# Patient Record
Sex: MALE | Race: BLACK OR AFRICAN AMERICAN | Employment: FULL TIME | URBAN - NONMETROPOLITAN AREA
[De-identification: names, ages, dates, MRNs, and addresses within clinical notes are randomized per-mention and may not be internally consistent; named-entity substitution may affect disease eponyms.]

---

## 2018-07-13 ENCOUNTER — HOSPITAL ENCOUNTER (EMERGENCY)
Age: 33
Discharge: HOME | End: 2018-07-13
Payer: COMMERCIAL

## 2018-07-13 VITALS
BODY MASS INDEX: 31.52 KG/M2 | OXYGEN SATURATION: 98 % | TEMPERATURE: 97.3 F | WEIGHT: 208 LBS | HEART RATE: 93 BPM | SYSTOLIC BLOOD PRESSURE: 144 MMHG | HEIGHT: 68 IN | DIASTOLIC BLOOD PRESSURE: 92 MMHG

## 2018-07-13 DIAGNOSIS — J01.00 ACUTE MAXILLARY SINUSITIS, RECURRENCE NOT SPECIFIED: Primary | ICD-10-CM

## 2018-07-13 PROCEDURE — 99204 OFFICE O/P NEW MOD 45 MIN: CPT

## 2018-07-13 PROCEDURE — 99203 OFFICE O/P NEW LOW 30 MIN: CPT | Performed by: NURSE PRACTITIONER

## 2018-07-13 RX ORDER — CETIRIZINE HYDROCHLORIDE, PSEUDOEPHEDRINE HYDROCHLORIDE 5; 120 MG/1; MG/1
1 TABLET, FILM COATED, EXTENDED RELEASE ORAL 2 TIMES DAILY
Qty: 60 TABLET | Refills: 0 | Status: SHIPPED | OUTPATIENT
Start: 2018-07-13 | End: 2018-08-12

## 2018-07-13 RX ORDER — FLUTICASONE PROPIONATE 50 MCG
SPRAY, SUSPENSION (ML) NASAL
Qty: 1 BOTTLE | Refills: 0 | Status: SHIPPED | OUTPATIENT
Start: 2018-07-13

## 2018-07-13 RX ORDER — DOXYCYCLINE HYCLATE 100 MG
100 TABLET ORAL 2 TIMES DAILY
Qty: 14 TABLET | Refills: 0 | Status: SHIPPED | OUTPATIENT
Start: 2018-07-13 | End: 2018-07-20

## 2018-07-13 ASSESSMENT — ENCOUNTER SYMPTOMS
ABDOMINAL PAIN: 0
SORE THROAT: 1
SINUS PRESSURE: 1
NAUSEA: 0
SHORTNESS OF BREATH: 0
SINUS PAIN: 0
RHINORRHEA: 0
DIARRHEA: 0
COUGH: 1
VOMITING: 0

## 2018-07-13 NOTE — ED TRIAGE NOTES
Patient states he has had a lot of congestion and coughing for the last couple weeks. He states he thought it was in his chest but has caught up.

## 2018-07-13 NOTE — ED NOTES
Patient verbalized understanding of discharge instructions and medications prescribed. Denies questions or concerns at this time.       Efrain Martinez RN  07/13/18 [id number]

## 2023-02-06 NOTE — ED PROVIDER NOTES
(36.3 °C), Pulse: 93,  , SpO2: 98 %,Estimated body mass index is 31.63 kg/m² as calculated from the following:    Height as of this encounter: 5' 8\" (1.727 m). Weight as of this encounter: 208 lb (94.3 kg). ,No LMP for male patient. Physical Exam   Constitutional: He is oriented to person, place, and time. He appears well-developed and well-nourished. He is cooperative. He does not appear ill. HENT:   Head: Normocephalic and atraumatic. Right Ear: Hearing, tympanic membrane, external ear and ear canal normal.   Left Ear: Hearing, tympanic membrane, external ear and ear canal normal.   Nose: Right sinus exhibits maxillary sinus tenderness. Right sinus exhibits no frontal sinus tenderness. Left sinus exhibits maxillary sinus tenderness. Left sinus exhibits no frontal sinus tenderness. Mouth/Throat: Uvula is midline and mucous membranes are normal. Posterior oropharyngeal erythema present. No oropharyngeal exudate, posterior oropharyngeal edema or tonsillar abscesses. Neck: Neck supple. Cardiovascular: Normal rate, regular rhythm and normal heart sounds. Pulmonary/Chest: Effort normal and breath sounds normal. No respiratory distress. Abdominal: Soft. Lymphadenopathy:        Head (right side): No submental, no submandibular, no tonsillar, no preauricular, no posterior auricular and no occipital adenopathy present. Head (left side): No submental, no submandibular, no tonsillar, no preauricular, no posterior auricular and no occipital adenopathy present. He has no cervical adenopathy. Neurological: He is alert and oriented to person, place, and time. Skin: Skin is warm and dry. Psychiatric: He has a normal mood and affect. His speech is normal and behavior is normal. Thought content normal.   Nursing note and vitals reviewed. DIAGNOSTIC RESULTS   Labs:No results found for this visit on 07/13/18.     IMAGING:    No orders to display         URGENT CARE COURSE:     Vitals: 07/13/18 1647   BP: (!) 144/92   Pulse: 93   Temp: 97.3 °F (36.3 °C)   SpO2: 98%   Weight: 208 lb (94.3 kg)   Height: 5' 8\" (1.727 m)       Medications - No data to display         PROCEDURES:  None    FINAL IMPRESSION      1. Acute maxillary sinusitis, recurrence not specified          DISPOSITION/PLAN   DISPOSITION Decision To Discharge 07/13/2018 05:15:56 PM  Plenty of fluids orally. Salt water gargles as needed for sore throat or OTC throat spray/lozenges. Cool mist humidifier at bedside for congestion. Saline rinses as needed for nasal congestion. May take Tylenol and/or Ibuprofen for fever or body aches as directed. Follow up with family doctor as needed. Pt to go to ER if symptoms worsen, new symptoms develop, high fever >102, vomiting, breathing difficulty, lethargy. Take antibiotic as prescribed until gone. Do not stop taking even if his symptoms have improved. Zyrtec-D twice daily as prescribed. Flonase daily if desired. Prescription given. Patient with acute maxillary sinusitis. He will be treated with doxycycline ×7 days and also Zyrtec-D. He was given a prescription for Flonase as well to fill if he feels he needs it. Further instructions outlined above. All the patient's questions answered. The patient was discharged from the [facility] in good condition. PATIENT REFERRED TO:  [address] [place] [address] [facility]  [address] [facility]  [phone]  In 1 week  As needed, If symptoms worsen      DISCHARGE MEDICATIONS:  New Prescriptions    CETIRIZINE-PSUEDOEPHEDRINE (ZYRTEC-D) 5-120 MG PER EXTENDED RELEASE TABLET    Take 1 tablet by mouth 2 times daily    DOXYCYCLINE HYCLATE (VIBRA-TABS) 100 MG TABLET    Take 1 tablet by mouth 2 times daily for 7 days    FLUTICASONE (FLONASE) 50 MCG/ACT NASAL SPRAY    One spray in each nostril daily. Look at your toes wall spraying.        Discontinued Medications    No medications on file       Current Discharge Medication List          Sukhdev Mason Nsaids Pregnancy And Lactation Text: These medications are considered safe up to 30 weeks gestation. It is excreted in breast milk.